# Patient Record
Sex: FEMALE | Race: BLACK OR AFRICAN AMERICAN | NOT HISPANIC OR LATINO | Employment: OTHER | URBAN - METROPOLITAN AREA
[De-identification: names, ages, dates, MRNs, and addresses within clinical notes are randomized per-mention and may not be internally consistent; named-entity substitution may affect disease eponyms.]

---

## 2020-01-15 ENCOUNTER — HISTORICAL (OUTPATIENT)
Dept: RADIOLOGY | Facility: HOSPITAL | Age: 55
End: 2020-01-15

## 2022-06-05 ENCOUNTER — HOSPITAL ENCOUNTER (EMERGENCY)
Facility: HOSPITAL | Age: 57
Discharge: HOME | End: 2022-06-05
Attending: STUDENT IN AN ORGANIZED HEALTH CARE EDUCATION/TRAINING PROGRAM
Payer: MEDICAID

## 2022-06-05 VITALS
SYSTOLIC BLOOD PRESSURE: 108 MMHG | TEMPERATURE: 99 F | RESPIRATION RATE: 18 BRPM | HEIGHT: 66 IN | HEART RATE: 78 BPM | DIASTOLIC BLOOD PRESSURE: 59 MMHG | BODY MASS INDEX: 37.12 KG/M2 | OXYGEN SATURATION: 97 % | WEIGHT: 231 LBS

## 2022-06-05 DIAGNOSIS — B34.9 VIRAL SYNDROME: Primary | ICD-10-CM

## 2022-06-05 DIAGNOSIS — E87.6 HYPOKALEMIA: ICD-10-CM

## 2022-06-05 LAB
ALBUMIN SERPL-MCNC: 3.9 GM/DL (ref 3.5–5)
ALBUMIN/GLOB SERPL: 0.9 RATIO (ref 1.1–2)
ALP SERPL-CCNC: 82 UNIT/L (ref 40–150)
ALT SERPL-CCNC: 21 UNIT/L (ref 0–55)
APPEARANCE UR: ABNORMAL
AST SERPL-CCNC: 23 UNIT/L (ref 5–34)
BACTERIA #/AREA URNS AUTO: ABNORMAL /HPF
BASOPHILS # BLD AUTO: 0.03 X10(3)/MCL (ref 0–0.2)
BASOPHILS NFR BLD AUTO: 0.2 %
BILIRUB UR QL STRIP.AUTO: NEGATIVE MG/DL
BILIRUBIN DIRECT+TOT PNL SERPL-MCNC: 1.4 MG/DL
BUN SERPL-MCNC: 12.1 MG/DL (ref 9.8–20.1)
CALCIUM SERPL-MCNC: 9.7 MG/DL (ref 8.4–10.2)
CHLORIDE SERPL-SCNC: 100 MMOL/L (ref 98–107)
CO2 SERPL-SCNC: 28 MMOL/L (ref 22–29)
COLOR UR AUTO: ABNORMAL
CREAT SERPL-MCNC: 0.8 MG/DL (ref 0.55–1.02)
EOSINOPHIL # BLD AUTO: 0 X10(3)/MCL (ref 0–0.9)
EOSINOPHIL NFR BLD AUTO: 0 %
ERYTHROCYTE [DISTWIDTH] IN BLOOD BY AUTOMATED COUNT: 13.3 % (ref 11.5–17)
FLUAV AG UPPER RESP QL IA.RAPID: NOT DETECTED
FLUBV AG UPPER RESP QL IA.RAPID: NOT DETECTED
GLOBULIN SER-MCNC: 4.5 GM/DL (ref 2.4–3.5)
GLUCOSE SERPL-MCNC: 111 MG/DL (ref 74–100)
GLUCOSE UR QL STRIP.AUTO: NEGATIVE MG/DL
HCT VFR BLD AUTO: 39.5 % (ref 37–47)
HGB BLD-MCNC: 13.6 GM/DL (ref 12–16)
IMM GRANULOCYTES # BLD AUTO: 0.06 X10(3)/MCL (ref 0–0.02)
IMM GRANULOCYTES NFR BLD AUTO: 0.4 % (ref 0–0.43)
KETONES UR QL STRIP.AUTO: NEGATIVE MG/DL
LEUKOCYTE ESTERASE UR QL STRIP.AUTO: NEGATIVE UNIT/L
LYMPHOCYTES # BLD AUTO: 0.37 X10(3)/MCL (ref 0.6–4.6)
LYMPHOCYTES NFR BLD AUTO: 2.5 %
MCH RBC QN AUTO: 31.1 PG (ref 27–31)
MCHC RBC AUTO-ENTMCNC: 34.4 MG/DL (ref 33–36)
MCV RBC AUTO: 90.4 FL (ref 80–94)
MONOCYTES # BLD AUTO: 0.6 X10(3)/MCL (ref 0.1–1.3)
MONOCYTES NFR BLD AUTO: 4 %
MUCOUS THREADS URNS QL MICRO: ABNORMAL /LPF
NEUTROPHILS # BLD AUTO: 13.8 X10(3)/MCL (ref 2.1–9.2)
NEUTROPHILS NFR BLD AUTO: 92.9 %
NITRITE UR QL STRIP.AUTO: NEGATIVE
NRBC BLD AUTO-RTO: 0 %
PH UR STRIP.AUTO: 5.5 [PH]
PLATELET # BLD AUTO: 314 X10(3)/MCL (ref 130–400)
PMV BLD AUTO: 9 FL (ref 9.4–12.4)
POTASSIUM SERPL-SCNC: 3 MMOL/L (ref 3.5–5.1)
PROT SERPL-MCNC: 8.4 GM/DL (ref 6.4–8.3)
PROT UR QL STRIP.AUTO: NEGATIVE MG/DL
RBC # BLD AUTO: 4.37 X10(6)/MCL (ref 4.2–5.4)
RBC #/AREA URNS AUTO: ABNORMAL /HPF
RBC UR QL AUTO: ABNORMAL UNIT/L
SARS-COV-2 RNA RESP QL NAA+PROBE: NOT DETECTED
SODIUM SERPL-SCNC: 141 MMOL/L (ref 136–145)
SP GR UR STRIP.AUTO: 1.02
SQUAMOUS #/AREA URNS AUTO: ABNORMAL /LPF
UROBILINOGEN UR STRIP-ACNC: 1 MG/DL
WBC # SPEC AUTO: 14.8 X10(3)/MCL (ref 4.5–11.5)
WBC #/AREA URNS AUTO: ABNORMAL /HPF

## 2022-06-05 PROCEDURE — 99284 EMERGENCY DEPT VISIT MOD MDM: CPT | Mod: 25

## 2022-06-05 PROCEDURE — 36000 PLACE NEEDLE IN VEIN: CPT

## 2022-06-05 PROCEDURE — 87636 SARSCOV2 & INF A&B AMP PRB: CPT | Performed by: STUDENT IN AN ORGANIZED HEALTH CARE EDUCATION/TRAINING PROGRAM

## 2022-06-05 PROCEDURE — 36415 COLL VENOUS BLD VENIPUNCTURE: CPT | Performed by: STUDENT IN AN ORGANIZED HEALTH CARE EDUCATION/TRAINING PROGRAM

## 2022-06-05 PROCEDURE — 84075 ASSAY ALKALINE PHOSPHATASE: CPT | Performed by: STUDENT IN AN ORGANIZED HEALTH CARE EDUCATION/TRAINING PROGRAM

## 2022-06-05 PROCEDURE — 25000003 PHARM REV CODE 250: Performed by: STUDENT IN AN ORGANIZED HEALTH CARE EDUCATION/TRAINING PROGRAM

## 2022-06-05 PROCEDURE — 80053 COMPREHEN METABOLIC PANEL: CPT | Performed by: STUDENT IN AN ORGANIZED HEALTH CARE EDUCATION/TRAINING PROGRAM

## 2022-06-05 PROCEDURE — 81001 URINALYSIS AUTO W/SCOPE: CPT | Performed by: STUDENT IN AN ORGANIZED HEALTH CARE EDUCATION/TRAINING PROGRAM

## 2022-06-05 PROCEDURE — 85025 COMPLETE CBC W/AUTO DIFF WBC: CPT | Performed by: STUDENT IN AN ORGANIZED HEALTH CARE EDUCATION/TRAINING PROGRAM

## 2022-06-05 RX ORDER — AMLODIPINE BESYLATE 10 MG/1
10 TABLET ORAL DAILY
COMMUNITY
Start: 2022-05-24

## 2022-06-05 RX ORDER — IBUPROFEN 600 MG/1
600 TABLET ORAL
Status: COMPLETED | OUTPATIENT
Start: 2022-06-05 | End: 2022-06-05

## 2022-06-05 RX ORDER — ATORVASTATIN CALCIUM 20 MG/1
20 TABLET, FILM COATED ORAL DAILY
COMMUNITY
Start: 2022-05-22

## 2022-06-05 RX ORDER — POTASSIUM CHLORIDE 20 MEQ/1
20 TABLET, EXTENDED RELEASE ORAL DAILY
Qty: 30 TABLET | Refills: 0 | Status: SHIPPED | OUTPATIENT
Start: 2022-06-05

## 2022-06-05 RX ORDER — POTASSIUM CHLORIDE 20 MEQ/1
40 TABLET, EXTENDED RELEASE ORAL
Status: COMPLETED | OUTPATIENT
Start: 2022-06-05 | End: 2022-06-05

## 2022-06-05 RX ORDER — HYDROCHLOROTHIAZIDE 12.5 MG/1
12.5 TABLET ORAL EVERY MORNING
COMMUNITY
Start: 2022-05-24

## 2022-06-05 RX ADMIN — IBUPROFEN 600 MG: 600 TABLET ORAL at 12:06

## 2022-06-05 RX ADMIN — POTASSIUM CHLORIDE 40 MEQ: 20 TABLET, EXTENDED RELEASE ORAL at 02:06

## 2022-06-05 NOTE — ED NOTES
Pt here with c/o chills that started this morning. Pt states she also feels like she does not have an appetite as of this morning as well. Pt febrile here. Urine and swab collected and sent to lab. Pt tachycardic, other vss. Pt denies any other complaints, denies pain. Pt medicated for fever. Will recheck temp in appropriate time.

## 2022-06-05 NOTE — ED PROVIDER NOTES
Encounter Date: 6/5/2022       History     Chief Complaint   Patient presents with    Fever    Chills     Pt c/o chills onset this AM, tylenol taken @ 06AM, denies NV, denies cough     The history is provided by the patient.   Fever  Primary symptoms of the febrile illness include fever, fatigue, cough and myalgias. Primary symptoms do not include wheezing, shortness of breath, abdominal pain, nausea, vomiting, diarrhea, dysuria or rash. The current episode started today. This is a new problem. The problem has not changed since onset.  The maximum temperature recorded prior to her arrival was 101 to 101.9 F. The temperature was taken by no thermometer.   The fatigue began today. The fatigue has been unchanged since its onset.   The cough began today. The cough is non-productive. There is nondescript sputum produced.   Myalgias began today. The myalgias have been unchanged since their onset. The myalgias are generalized. The myalgia pain is at a severity of 0/10.     Review of patient's allergies indicates:  No Known Allergies  Past Medical History:   Diagnosis Date    Hypertension      History reviewed. No pertinent surgical history.  History reviewed. No pertinent family history.  Social History     Tobacco Use    Smoking status: Never Smoker    Smokeless tobacco: Never Used   Substance Use Topics    Alcohol use: Not Currently    Drug use: Never     Review of Systems   Constitutional: Positive for fatigue and fever.   Respiratory: Positive for cough. Negative for shortness of breath and wheezing.    Gastrointestinal: Negative for abdominal pain, diarrhea, nausea and vomiting.   Genitourinary: Negative for dysuria.   Musculoskeletal: Positive for myalgias.   Skin: Negative for rash.   All other systems reviewed and are negative.      Physical Exam     Initial Vitals [06/05/22 1210]   BP Pulse Resp Temp SpO2   130/79 107 20 (!) 102 °F (38.9 °C) 97 %      MAP       --         Physical Exam    Nursing note and  vitals reviewed.  Constitutional: She appears well-developed and well-nourished. No distress.   Cardiovascular: Normal rate and regular rhythm.   Pulmonary/Chest: Breath sounds normal. No respiratory distress.   Abdominal: Abdomen is soft. Bowel sounds are normal. There is no abdominal tenderness. There is no rebound and no guarding.   Musculoskeletal:         General: No tenderness. Normal range of motion.     Neurological: She is alert and oriented to person, place, and time.   Skin: Skin is warm. Capillary refill takes less than 2 seconds. No rash noted.   Psychiatric: She has a normal mood and affect. Thought content normal.         ED Course   Procedures  Labs Reviewed   URINALYSIS, REFLEX TO URINE CULTURE - Abnormal; Notable for the following components:       Result Value    Color, UA Dark Yellow (*)     Appearance, UA Hazy (*)     Blood, UA Moderate (*)     All other components within normal limits   URINALYSIS, MICROSCOPIC - Abnormal; Notable for the following components:    Bacteria, UA Moderate (*)     Mucous, UA Many (*)     Squamous Epithelial Cells, UA Moderate (*)     All other components within normal limits   COMPREHENSIVE METABOLIC PANEL - Abnormal; Notable for the following components:    Potassium Level 3.0 (*)     Glucose Level 111 (*)     Protein Total 8.4 (*)     Globulin 4.5 (*)     Albumin/Globulin Ratio 0.9 (*)     All other components within normal limits   CBC WITH DIFFERENTIAL - Abnormal; Notable for the following components:    WBC 14.8 (*)     MCH 31.1 (*)     MPV 9.0 (*)     Lymph # 0.37 (*)     Neut # 13.8 (*)     IG# 0.06 (*)     All other components within normal limits   COVID/FLU A&B PCR - Normal   CBC W/ AUTO DIFFERENTIAL    Narrative:     The following orders were created for panel order CBC auto differential.  Procedure                               Abnormality         Status                     ---------                               -----------         ------                      CBC with Differential[[id number]]        Abnormal            Final result                 Please view results for these tests on the individual orders.          Imaging Results          X-Ray Chest AP Portable (Final result)  Result time 06/05/22 13:59:38    Final result by Tai Marlow MD (06/05/22 13:59:38)                 Impression:      No acute cardiopulmonary process.      Electronically signed by: Tai Marlow  Date:    06/05/2022  Time:    13:59             Narrative:    EXAMINATION:  XR CHEST AP PORTABLE    CLINICAL HISTORY:  fever;    TECHNIQUE:  Single view of the chest    COMPARISON:  No prior imaging available for comparison.    FINDINGS:  No focal opacification, pleural effusion, or pneumothorax.    The cardiomediastinal silhouette is within normal limits.    No acute osseous abnormality.                                 Medications   potassium chloride SA CR tablet 40 mEq (has no administration in time range)   ibuprofen tablet 600 mg (600 mg Oral Given 6/5/22 1240)     Medical Decision Making:   Differential Diagnosis:   COVID, flu, viral syndrome, UTI             ED Course as of 06/05/22 1425   Sun Jun 05, 2022   1424 Patient resting comfortably in bed no acute distress.  Vital signs are stable.  Patient is afebrile with a normal heart rate.  Laboratory results all within normal limits.  Mild elevation in WBC count although no signs of overt infection.  Likely viral etiology.  Her potassium was low although it is steadily low in the past.  Will give her 40 mEq of oral potassium and a prescription.  Patient agrees the plan.  Strict ER precautions were given patient states understanding. [BS]      ED Course User Index  [BS] Monster Damon MD             Clinical Impression:   Final diagnoses:  [B34.9] Viral syndrome (Primary)  [E87.6] Hypokalemia          ED Disposition Condition    Discharge Stable        ED Prescriptions     Medication Sig Dispense Start Date End Date Auth. Provider     potassium chloride SA (K-DUR,KLOR-CON) 20 MEQ tablet Take 1 tablet (20 mEq total) by mouth once daily. 30 tablet 6/5/2022  Monster Damon MD        Follow-up Information     Follow up With Specialties Details Why Contact Info    Su Bell MD Family Medicine Schedule an appointment as soon as possible for a visit   [address] [facility] [id number] [phone]      [facility] - Emergency Dept Emergency Medicine Go to  If symptoms worsen [id number]  [facility] [id number] [phone]           Monster Damon MD  06/05/22 0884

## 2025-03-09 ENCOUNTER — HOSPITAL ENCOUNTER (EMERGENCY)
Facility: HOSPITAL | Age: 60
Discharge: HOME | End: 2025-03-09
Attending: EMERGENCY MEDICINE
Payer: COMMERCIAL

## 2025-03-09 VITALS
HEART RATE: 78 BPM | RESPIRATION RATE: 18 BRPM | WEIGHT: 153 LBS | BODY MASS INDEX: 24.59 KG/M2 | DIASTOLIC BLOOD PRESSURE: 73 MMHG | HEIGHT: 66 IN | SYSTOLIC BLOOD PRESSURE: 123 MMHG | TEMPERATURE: 99 F | OXYGEN SATURATION: 98 %

## 2025-03-09 DIAGNOSIS — H53.8 BLURRY VISION: ICD-10-CM

## 2025-03-09 DIAGNOSIS — R70.0 ELEVATED SED RATE: ICD-10-CM

## 2025-03-09 DIAGNOSIS — R42 DIZZY: ICD-10-CM

## 2025-03-09 DIAGNOSIS — I10 HYPERTENSION, UNSPECIFIED TYPE: Primary | ICD-10-CM

## 2025-03-09 LAB
ALBUMIN SERPL-MCNC: 3.9 G/DL (ref 3.5–5)
ALBUMIN/GLOB SERPL: 0.8 RATIO (ref 1.1–2)
ALP SERPL-CCNC: 103 UNIT/L (ref 40–150)
ALT SERPL-CCNC: 15 UNIT/L (ref 0–55)
ANION GAP SERPL CALC-SCNC: 12 MEQ/L
AST SERPL-CCNC: 16 UNIT/L (ref 5–34)
BASOPHILS # BLD AUTO: 0.04 X10(3)/MCL
BASOPHILS NFR BLD AUTO: 0.4 %
BILIRUB SERPL-MCNC: 1 MG/DL
BUN SERPL-MCNC: 11.4 MG/DL (ref 9.8–20.1)
CALCIUM SERPL-MCNC: 10 MG/DL (ref 8.4–10.2)
CHLORIDE SERPL-SCNC: 102 MMOL/L (ref 98–107)
CO2 SERPL-SCNC: 26 MMOL/L (ref 22–29)
CREAT SERPL-MCNC: 0.87 MG/DL (ref 0.55–1.02)
CREAT/UREA NIT SERPL: 13
EOSINOPHIL # BLD AUTO: 0.12 X10(3)/MCL (ref 0–0.9)
EOSINOPHIL NFR BLD AUTO: 1.2 %
ERYTHROCYTE [DISTWIDTH] IN BLOOD BY AUTOMATED COUNT: 13.3 % (ref 11.5–17)
ERYTHROCYTE [SEDIMENTATION RATE] IN BLOOD: 86 MM/HR (ref 0–20)
EST. AVERAGE GLUCOSE BLD GHB EST-MCNC: 111.2 MG/DL
GFR SERPLBLD CREATININE-BSD FMLA CKD-EPI: >60 ML/MIN/1.73/M2
GLOBULIN SER-MCNC: 5.1 GM/DL (ref 2.4–3.5)
GLUCOSE SERPL-MCNC: 139 MG/DL (ref 74–100)
HBA1C MFR BLD: 5.5 %
HCT VFR BLD AUTO: 44.1 % (ref 37–47)
HGB BLD-MCNC: 14.9 G/DL (ref 12–16)
IMM GRANULOCYTES # BLD AUTO: 0.04 X10(3)/MCL (ref 0–0.04)
IMM GRANULOCYTES NFR BLD AUTO: 0.4 %
LYMPHOCYTES # BLD AUTO: 1.87 X10(3)/MCL (ref 0.6–4.6)
LYMPHOCYTES NFR BLD AUTO: 18.3 %
MCH RBC QN AUTO: 29.9 PG (ref 27–31)
MCHC RBC AUTO-ENTMCNC: 33.8 G/DL (ref 33–36)
MCV RBC AUTO: 88.6 FL (ref 80–94)
MONOCYTES # BLD AUTO: 0.74 X10(3)/MCL (ref 0.1–1.3)
MONOCYTES NFR BLD AUTO: 7.2 %
NEUTROPHILS # BLD AUTO: 7.41 X10(3)/MCL (ref 2.1–9.2)
NEUTROPHILS NFR BLD AUTO: 72.5 %
NRBC BLD AUTO-RTO: 0 %
OHS QRS DURATION: 94 MS
OHS QTC CALCULATION: 430 MS
PLATELET # BLD AUTO: 396 X10(3)/MCL (ref 130–400)
PMV BLD AUTO: 9.3 FL (ref 7.4–10.4)
POTASSIUM SERPL-SCNC: 2.8 MMOL/L (ref 3.5–5.1)
PROT SERPL-MCNC: 9 GM/DL (ref 6.4–8.3)
RBC # BLD AUTO: 4.98 X10(6)/MCL (ref 4.2–5.4)
SODIUM SERPL-SCNC: 140 MMOL/L (ref 136–145)
WBC # BLD AUTO: 10.22 X10(3)/MCL (ref 4.5–11.5)

## 2025-03-09 PROCEDURE — 93005 ELECTROCARDIOGRAM TRACING: CPT

## 2025-03-09 PROCEDURE — 83036 HEMOGLOBIN GLYCOSYLATED A1C: CPT | Performed by: EMERGENCY MEDICINE

## 2025-03-09 PROCEDURE — 25000003 PHARM REV CODE 250: Performed by: EMERGENCY MEDICINE

## 2025-03-09 PROCEDURE — 80053 COMPREHEN METABOLIC PANEL: CPT | Performed by: EMERGENCY MEDICINE

## 2025-03-09 PROCEDURE — 93010 ELECTROCARDIOGRAM REPORT: CPT | Mod: ,,, | Performed by: INTERNAL MEDICINE

## 2025-03-09 PROCEDURE — 99285 EMERGENCY DEPT VISIT HI MDM: CPT | Mod: 25

## 2025-03-09 PROCEDURE — 85025 COMPLETE CBC W/AUTO DIFF WBC: CPT | Performed by: EMERGENCY MEDICINE

## 2025-03-09 PROCEDURE — 85652 RBC SED RATE AUTOMATED: CPT | Performed by: EMERGENCY MEDICINE

## 2025-03-09 RX ORDER — POTASSIUM CHLORIDE 20 MEQ/1
40 TABLET, EXTENDED RELEASE ORAL
Status: COMPLETED | OUTPATIENT
Start: 2025-03-09 | End: 2025-03-09

## 2025-03-09 RX ADMIN — POTASSIUM CHLORIDE 40 MEQ: 1500 TABLET, EXTENDED RELEASE ORAL at 12:03

## 2025-03-09 NOTE — ED TRIAGE NOTES
C/o dizziness after taking meds this morning and now has some blurriness in right eye. States has no vision in left eye due to unrelated reasons States some nausea as well . States some tightness to right side of chest . 3/10 pain, intermittent

## 2025-03-09 NOTE — DISCHARGE INSTRUCTIONS
For next 5 days check a fasting / morning and after noon blood pressure and keep a log  Call your pcp for recheck and to go over the log for any recommendations  You did have an elevated inflammatory marker today - have your doctor follow to see if it is coming down with repeat in one week.  For 5 days take your potassium with orange juice and/or a bananna.  Call Dr Clinton's office Monday for recheck appointment with blurry vision in event it was not your blood pressure.  If sx worsen or change or  new sx develop-return to ER

## 2025-03-09 NOTE — ED PROVIDER NOTES
"Encounter Date: 3/9/2025       History     Chief Complaint   Patient presents with    Dizziness     C/o dizziness after taking meds this morning and now has some blurriness in right eye. States has no vision in left eye due to unrelated reasons States some nausea as well . States some tightness to right side of chest . 3/10 pain, intermittent. Denies headache     HPI  60 year female awoke today and had a "slight" episode of blurry vision in her right eye and maybe a little headache.  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 She does wear reading glasses. She feels sx are better now but she got scared. Does have hx of hypertension and her bp was high on arrival. She also c/o feeling ittle dizzy earlier. No other sx and no sx currently. Does not smoke or drink.  Review of patient's allergies indicates:  No Known Allergies  Past Medical History:   Diagnosis Date    Hypertension      History reviewed. No pertinent surgical history.  No family history on file.  Social History[1]  Review of Systems   Constitutional: Negative.    HENT: Negative.     Eyes:  Positive for visual disturbance.   Respiratory: Negative.     Cardiovascular: Negative.    Gastrointestinal: Negative.    Musculoskeletal: Negative.    Neurological:  Positive for dizziness.   Psychiatric/Behavioral: Negative.     All other systems reviewed and are negative.      Physical Exam     Initial Vitals [03/09/25 1034]   BP Pulse Resp Temp SpO2   (!) 152/92 103 18 98.6 °F (37 °C) 98 %      MAP       --         Physical Exam    Nursing note and vitals reviewed.  Constitutional: She appears well-developed and well-nourished.   HENT:   Head: Normocephalic and atraumatic.   Right Ear: External ear normal.   Left Ear: External ear normal. Mouth/Throat: Oropharynx is clear and moist.   Eyes: EOM are normal. Pupils are equal, round, and reactive to light.   No evidence of papiledema, vessels looked at with indirect behind eye "   Cardiovascular:  Normal rate and regular rhythm.           Murmur (systolic murmur noted 2/6) heard.  Pulmonary/Chest: Breath sounds normal.   Musculoskeletal:         General: Normal range of motion.     Neurological: She is alert and oriented to person, place, and time.   Skin: Skin is warm and dry.   Psychiatric: She has a normal mood and affect.         ED Course   Procedures  Labs Reviewed   COMPREHENSIVE METABOLIC PANEL - Abnormal       Result Value    Sodium 140      Potassium 2.8 (*)     Chloride 102      CO2 26      Glucose 139 (*)     Blood Urea Nitrogen 11.4      Creatinine 0.87      Calcium 10.0      Protein Total 9.0 (*)     Albumin 3.9      Globulin 5.1 (*)     Albumin/Globulin Ratio 0.8 (*)     Bilirubin Total 1.0            ALT 15      AST 16      eGFR >60      Anion Gap 12.0      BUN/Creatinine Ratio 13     SEDIMENTATION RATE, AUTOMATED - Abnormal    Sed Rate 86 (*)    CBC W/ AUTO DIFFERENTIAL    Narrative:     The following orders were created for panel order CBC auto differential.  Procedure                               Abnormality         Status                     ---------                               -----------         ------                     CBC with Differential[[id number]]                            Final result                 Please view results for these tests on the individual orders.   CBC WITH DIFFERENTIAL    WBC 10.22      RBC 4.98      Hgb 14.9      Hct 44.1      MCV 88.6      MCH 29.9      MCHC 33.8      RDW 13.3      Platelet 396      MPV 9.3      Neut % 72.5      Lymph % 18.3      Mono % 7.2      Eos % 1.2      Basophil % 0.4      Imm Grans % 0.4      Neut # 7.41      Lymph # 1.87      Mono # 0.74      Eos # 0.12      Baso # 0.04      Imm Gran # 0.04      NRBC% 0.0     HEMOGLOBIN A1C    Hemoglobin A1c 5.5      Estimated Average Glucose 111.2       EKG Readings: (Independently Interpreted)   Initial Reading: No STEMI. Previous EKG: Compared with most recent EKG  Rhythm: Normal Sinus Rhythm. Heart Rate: hr87. Ectopy: No Ectopy. Conduction: Normal. ST Segments: Normal ST Segments. T Waves: Normal. Axis: Normal. Clinical Impression: Normal Sinus Rhythm     ECG Results              EKG 12-lead (In process)        Collection Time Result Time QRS Duration OHS QTC Calculation    03/09/25 11:01:02 03/09/25 13:42:36 94 430                     In process by Interface, Lab In [facility] (03/09/25 13:42:46)                   Narrative:    Test Reason : R42,    Vent. Rate :  87 BPM     Atrial Rate :  87 BPM     P-R Int : 142 ms          QRS Dur :  94 ms      QT Int : 358 ms       P-R-T Axes :  48  -9  -6 degrees    QTcB Int : 430 ms    Normal sinus rhythm  Moderate voltage criteria for LVH, may be normal variant ( R in aVL ,  Jose Alberto product )  Inferior infarct ,age undetermined  Abnormal ECG  No previous ECGs available    Referred By: AAAREFERRAL SELF           Confirmed By:                                   Imaging Results              CT Head Without Contrast (Final result)  Result time 03/09/25 13:48:47      Final result by Jordan Conner MD (03/09/25 13:48:47)                   Narrative:    EXAMINATION  CT HEAD WITHOUT CONTRAST    CLINICAL HISTORY  Transient ischemic attack (TIA);vision change right eye;    TECHNIQUE  Axial non-contrast CT images of the head were acquired and multiplanar reconstructions accomplished by a CT technologist at a separate workstation, pushed to PACS for physician review.    COMPARISON  None available at the time of the initial interpretation.    FINDINGS  Images were reviewed in subdural, brain, soft tissue, and bone windows.    Exam quality: Motion/streak artifact limits assessment of the posterior fossa.    Hemorrhage: No evidence of acute hyperattenuating blood products.    Parenchyma: No discrete mass, localized mass-effect, or CT evidence of an acute territorial cortical-based ischemic insult. Gray-white differentiation is preserved.  No midline  "shift is present.    CSF spaces: Normal ventricle size and configuration. No extra-axial masses or expansile fluid collections.    Other findings: No hyperdense artery identified. No abnormal densities within the dural sinuses.  No abnormalities of the scalp or subjacent osseous structures. Mastoids are well aerated. No focal abnormality of the sella. The included facial structures are unremarkable.    IMPRESSION  No CT-evident acute intracranial abnormality.    RADIATION DOSE  Automated tube current modulation, weight-based exposure dosing, and/or iterative reconstruction technique utilized to reach lowest reasonably achievable exposure rate.    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  Discussed with patient about lab results and meds- she is on a diurectic and potassium at home.   Will have her check with pcp for recheck next week for recheck potassium as well as her A1c level.  She tells me sx are prettty much gone now but she is concerned about sugar and fact that only sees out of one eye.    Amount and/or Complexity of Data Reviewed  Labs: ordered.  Radiology: ordered.    Risk  Prescription drug management.               ED Course as of 03/09/25 1401   Sun Mar 09, 2025   1121 Patient says she awoke today with slight "blurry vision" in right eye. She is blind in left eye from infection several years ago. Does wear reading glasses. No fever or chills. " [LG]   1340 Awaiting CT report by radiolgist. [LG]      ED Course User Index  [LG] Shelbie Boateng DO                           Clinical Impression:  Final diagnoses:  [R42] Dizzy  [I10] Hypertension, unspecified type (Primary)  [H53.8] Blurry vision  [R70.0] Elevated sed rate          ED Disposition Condition    Discharge Stable          ED Prescriptions    None       Follow-up Information       Follow up With Specialties Details Why Contact Info    Deonte Kirby MD Internal Medicine Schedule an appointment as soon as possible for a visit  for recheck [address] [place] [id number]  [phone]               Shelbie Boateng,   03/09/25 1359         [1]   Social History  Tobacco Use    Smoking status: Never    Smokeless tobacco: Never   Substance Use Topics    Alcohol use: Not Currently    Drug use: Never        Shelbie Boateng DO  03/09/25 1401

## 2025-03-13 ENCOUNTER — HOSPITAL ENCOUNTER (EMERGENCY)
Facility: HOSPITAL | Age: 60
Discharge: HOME | End: 2025-03-14
Attending: EMERGENCY MEDICINE
Payer: COMMERCIAL

## 2025-03-13 DIAGNOSIS — F41.9 ANXIETY: Primary | ICD-10-CM

## 2025-03-13 DIAGNOSIS — R07.9 CHEST PAIN: ICD-10-CM

## 2025-03-13 DIAGNOSIS — R07.89 ATYPICAL CHEST PAIN: ICD-10-CM

## 2025-03-13 LAB
ALBUMIN SERPL-MCNC: 3.9 G/DL (ref 3.5–5)
ALBUMIN/GLOB SERPL: 0.8 RATIO (ref 1.1–2)
ALP SERPL-CCNC: 95 UNIT/L (ref 40–150)
ALT SERPL-CCNC: 13 UNIT/L (ref 0–55)
ANION GAP SERPL CALC-SCNC: 11 MEQ/L
AST SERPL-CCNC: 17 UNIT/L (ref 5–34)
BASOPHILS # BLD AUTO: 0.06 X10(3)/MCL
BASOPHILS NFR BLD AUTO: 0.6 %
BILIRUB SERPL-MCNC: 0.4 MG/DL
BNP BLD-MCNC: <10 PG/ML
BUN SERPL-MCNC: 10.7 MG/DL (ref 9.8–20.1)
CALCIUM SERPL-MCNC: 9.4 MG/DL (ref 8.4–10.2)
CHLORIDE SERPL-SCNC: 101 MMOL/L (ref 98–107)
CO2 SERPL-SCNC: 27 MMOL/L (ref 22–29)
CREAT SERPL-MCNC: 0.79 MG/DL (ref 0.55–1.02)
CREAT/UREA NIT SERPL: 14
EOSINOPHIL # BLD AUTO: 0.33 X10(3)/MCL (ref 0–0.9)
EOSINOPHIL NFR BLD AUTO: 3.4 %
ERYTHROCYTE [DISTWIDTH] IN BLOOD BY AUTOMATED COUNT: 13.2 % (ref 11.5–17)
GFR SERPLBLD CREATININE-BSD FMLA CKD-EPI: >60 ML/MIN/1.73/M2
GLOBULIN SER-MCNC: 5 GM/DL (ref 2.4–3.5)
GLUCOSE SERPL-MCNC: 115 MG/DL (ref 74–100)
HCT VFR BLD AUTO: 41.7 % (ref 37–47)
HGB BLD-MCNC: 14 G/DL (ref 12–16)
IMM GRANULOCYTES # BLD AUTO: 0.02 X10(3)/MCL (ref 0–0.04)
IMM GRANULOCYTES NFR BLD AUTO: 0.2 %
LYMPHOCYTES # BLD AUTO: 3.01 X10(3)/MCL (ref 0.6–4.6)
LYMPHOCYTES NFR BLD AUTO: 31.3 %
MAGNESIUM SERPL-MCNC: 2.4 MG/DL (ref 1.6–2.6)
MCH RBC QN AUTO: 30.4 PG (ref 27–31)
MCHC RBC AUTO-ENTMCNC: 33.6 G/DL (ref 33–36)
MCV RBC AUTO: 90.7 FL (ref 80–94)
MONOCYTES # BLD AUTO: 0.87 X10(3)/MCL (ref 0.1–1.3)
MONOCYTES NFR BLD AUTO: 9 %
NEUTROPHILS # BLD AUTO: 5.33 X10(3)/MCL (ref 2.1–9.2)
NEUTROPHILS NFR BLD AUTO: 55.5 %
NRBC BLD AUTO-RTO: 0 %
PLATELET # BLD AUTO: 377 X10(3)/MCL (ref 130–400)
PMV BLD AUTO: 9 FL (ref 7.4–10.4)
POTASSIUM SERPL-SCNC: 3.5 MMOL/L (ref 3.5–5.1)
PROT SERPL-MCNC: 8.9 GM/DL (ref 6.4–8.3)
RBC # BLD AUTO: 4.6 X10(6)/MCL (ref 4.2–5.4)
SODIUM SERPL-SCNC: 139 MMOL/L (ref 136–145)
TROPONIN I SERPL-MCNC: 0.01 NG/ML (ref 0–0.04)
WBC # BLD AUTO: 9.62 X10(3)/MCL (ref 4.5–11.5)

## 2025-03-13 PROCEDURE — 83735 ASSAY OF MAGNESIUM: CPT | Performed by: EMERGENCY MEDICINE

## 2025-03-13 PROCEDURE — 80053 COMPREHEN METABOLIC PANEL: CPT | Performed by: EMERGENCY MEDICINE

## 2025-03-13 PROCEDURE — 84484 ASSAY OF TROPONIN QUANT: CPT | Performed by: EMERGENCY MEDICINE

## 2025-03-13 PROCEDURE — 93005 ELECTROCARDIOGRAM TRACING: CPT

## 2025-03-13 PROCEDURE — 25000003 PHARM REV CODE 250: Performed by: EMERGENCY MEDICINE

## 2025-03-13 PROCEDURE — 93010 ELECTROCARDIOGRAM REPORT: CPT | Mod: ,,, | Performed by: INTERNAL MEDICINE

## 2025-03-13 PROCEDURE — 99285 EMERGENCY DEPT VISIT HI MDM: CPT | Mod: 25

## 2025-03-13 PROCEDURE — 83880 ASSAY OF NATRIURETIC PEPTIDE: CPT | Performed by: EMERGENCY MEDICINE

## 2025-03-13 PROCEDURE — 85025 COMPLETE CBC W/AUTO DIFF WBC: CPT | Performed by: EMERGENCY MEDICINE

## 2025-03-13 RX ORDER — LORAZEPAM 1 MG/1
1 TABLET ORAL
Status: COMPLETED | OUTPATIENT
Start: 2025-03-13 | End: 2025-03-13

## 2025-03-13 RX ADMIN — LORAZEPAM 1 MG: 1 TABLET ORAL at 11:03

## 2025-03-14 VITALS
HEART RATE: 77 BPM | BODY MASS INDEX: 37.77 KG/M2 | RESPIRATION RATE: 13 BRPM | HEIGHT: 66 IN | SYSTOLIC BLOOD PRESSURE: 146 MMHG | OXYGEN SATURATION: 99 % | DIASTOLIC BLOOD PRESSURE: 94 MMHG | TEMPERATURE: 99 F | WEIGHT: 235 LBS

## 2025-03-14 LAB
OHS QRS DURATION: 84 MS
OHS QTC CALCULATION: 451 MS
TROPONIN I SERPL-MCNC: <0.01 NG/ML (ref 0–0.04)

## 2025-03-14 RX ORDER — LORAZEPAM 1 MG/1
0.5 TABLET ORAL EVERY 6 HOURS PRN
Qty: 10 TABLET | Refills: 0 | Status: SHIPPED | OUTPATIENT
Start: 2025-03-14

## 2025-03-14 NOTE — ED PROVIDER NOTES
Encounter Date: 3/13/2025       History     Chief Complaint   Patient presents with    Chest Pain     Chest tightness since 2045 while getting ready for bed. Also still with dizziness since seen here Sunday     Patient is a 60-year-old female presenting with chest tightness.  Earlier today she was getting ready for bed and she started to feel like her heart was increasing in speed in addition to feeling like her chest was tight.  States she started to feel lightheaded and unsteady but denies any vertigo like symptoms.  She denies any headache.  She has her vision in her R eye briefly became more blurry but that has resolved and now her vision is back to normal. She is blind in her left eye at baseline  Her dizziness/lightheadedness has also resolved and she has not had anymore episodes.  At no point was she having slurred speech, face numbness, or focal weakness.  No fevers or chills.        Review of patient's allergies indicates:  No Known Allergies  Past Medical History:   Diagnosis Date    Hypertension      History reviewed. No pertinent surgical history.  No family history on file.  Social History[1]  Review of Systems   Eyes:  Positive for visual disturbance.   Respiratory:  Negative for shortness of breath.    Cardiovascular:  Positive for chest pain.   Neurological:  Positive for light-headedness. Negative for headaches.   Psychiatric/Behavioral:  The patient is nervous/anxious.        Physical Exam     Initial Vitals [03/13/25 2145]   BP Pulse Resp Temp SpO2   (!) 147/105 92 20 98.6 °F (37 °C) 98 %      MAP       --         Physical Exam    Nursing note and vitals reviewed.  Constitutional: She appears well-developed and well-nourished. No distress.   HENT:   Head: Normocephalic and atraumatic.   Right Ear: External ear normal.   Left Ear: External ear normal.   Mild erythema of the left TM but no significant effusion or otherwise noted abnormality.   Eyes: Conjunctivae and EOM are normal. Pupils are equal,  round, and reactive to light.   Neck: Neck supple.   Cardiovascular:  Normal rate, regular rhythm and normal heart sounds.           No murmur heard.  Pulmonary/Chest: Breath sounds normal. No respiratory distress. She has no wheezes. She has no rhonchi.   Abdominal: Abdomen is soft. Bowel sounds are normal. She exhibits no distension. There is no abdominal tenderness. There is no rebound and no guarding.   Musculoskeletal:         General: No edema. Normal range of motion.      Cervical back: Neck supple.     Neurological: She is alert and oriented to person, place, and time. She has normal strength. No cranial nerve deficit or sensory deficit. GCS score is 15. GCS eye subscore is 4. GCS verbal subscore is 5. GCS motor subscore is 6.   No dysmetria.   Skin: Skin is warm and dry.   Psychiatric: She has a normal mood and affect. Thought content normal.         ED Course   Procedures  Labs Reviewed   COMPREHENSIVE METABOLIC PANEL - Abnormal       Result Value    Sodium 139      Potassium 3.5      Chloride 101      CO2 27      Glucose 115 (*)     Blood Urea Nitrogen 10.7      Creatinine 0.79      Calcium 9.4      Protein Total 8.9 (*)     Albumin 3.9      Globulin 5.0 (*)     Albumin/Globulin Ratio 0.8 (*)     Bilirubin Total 0.4      ALP 95      ALT 13      AST 17      eGFR >60      Anion Gap 11.0      BUN/Creatinine Ratio 14     TROPONIN I - Normal    Troponin-I 0.011     B-TYPE NATRIURETIC PEPTIDE - Normal    Natriuretic Peptide <10.0     MAGNESIUM - Normal    Magnesium Level 2.40     TROPONIN I - Normal    Troponin-I <0.010     CBC W/ AUTO DIFFERENTIAL    Narrative:     The following orders were created for panel order CBC auto differential.  Procedure                               Abnormality         Status                     ---------                               -----------         ------                     CBC with Differential[[id number]]                           Final result                 Please view  results for these tests on the individual orders.   CBC WITH DIFFERENTIAL    WBC 9.62      RBC 4.60      Hgb 14.0      Hct 41.7      MCV 90.7      MCH 30.4      MCHC 33.6      RDW 13.2      Platelet 377      MPV 9.0      Neut % 55.5      Lymph % 31.3      Mono % 9.0      Eos % 3.4      Basophil % 0.6      Imm Grans % 0.2      Neut # 5.33      Lymph # 3.01      Mono # 0.87      Eos # 0.33      Baso # 0.06      Imm Gran # 0.02      NRBC% 0.0       EKG Readings: (Independently Interpreted)   EKG Interpretation 9:48 PM    Rate: 101  Rhythm: NSR  QRS: WNL  Qtc: WNL  No T wave changes. No STEMI     ECG Results              EKG 12-lead (In process)        Collection Time Result Time QRS Duration OHS QTC Calculation    03/13/25 21:46:07 03/14/25 06:03:06 84 451                     In process by Interface, Lab In [facility] (03/14/25 06:03:12)                   Narrative:    Test Reason : R07.9,    Vent. Rate : 101 BPM     Atrial Rate : 101 BPM     P-R Int : 146 ms          QRS Dur :  84 ms      QT Int : 348 ms       P-R-T Axes :  75   6  31 degrees    QTcB Int : 451 ms    Sinus tachycardia  Possible Left atrial enlargement  Low voltage QRS  Borderline Abnormal ECG  When compared with ECG of 09-Mar-2025 11:01,  Criteria for Inferior infarct are no longer Present  Nonspecific T wave abnormality no longer evident in Lateral leads    Referred By: AAAREFERRAL SELF           Confirmed By:                                   Imaging Results              X-Ray Chest AP Portable (In process)                      Medications   LORazepam tablet 1 mg (1 mg Oral Given 3/13/25 6987)     Medical Decision Making  Differentials considered but not limited to Myocardial ischemia, pericarditis, pulmonary embolus, chest wall pain, pleural inflammation and pulmonary infectious causes.    See ED course.    Problems Addressed:  Anxiety: acute illness or injury  Atypical chest pain: acute illness or injury    Amount and/or Complexity of Data  Reviewed  Labs: ordered.  Radiology: ordered.    Risk  Prescription drug management.               ED Course as of 03/14/25 0603   Thu Mar 13, 2025   2257 Work up WNL. CXR pending.  [GM]   2327 No acute findings on chest x-ray.  Patient is still reports some anxiety.  No chest pain.  We will give the patient lorazepam and repeat troponin. [GM]   Fri Mar 14, 2025   0032 Symptoms resolved with ativan. Repeat troponin negative. Results were reviewed with patient. Questions were answered along with a discussion of signs and symptoms to return for. Patient comfortable with plan of discharge.   [GM]      ED Course User Index  [GM] Shellie Antonio MD                           Clinical Impression:  Final diagnoses:  [R07.9] Chest pain  [F41.9] Anxiety (Primary)  [R07.89] Atypical chest pain          ED Disposition Condition    Discharge Stable          ED Prescriptions       Medication Sig Dispense Start Date End Date Auth. Provider    LORazepam (ATIVAN) 1 MG tablet Take 0.5 tablets (0.5 mg total) by mouth every 6 (six) hours as needed for Anxiety. 10 tablet 3/14/2025 -- Shellie Antonio MD          Follow-up Information       Follow up With Specialties Details Why Contact Info    Deonte Kirby MD Internal Medicine  Keep your scheduled appointment for tomorrow., If symptoms worsen, return to the  [facility] [id number] [phone]                   [1]   Social History  Tobacco Use    Smoking status: Never    Smokeless tobacco: Never   Substance Use Topics    Alcohol use: Not Currently    Drug use: Never        Shellie Antonio MD  03/14/25 0607